# Patient Record
Sex: FEMALE | Race: BLACK OR AFRICAN AMERICAN | NOT HISPANIC OR LATINO | Employment: UNEMPLOYED
[De-identification: names, ages, dates, MRNs, and addresses within clinical notes are randomized per-mention and may not be internally consistent; named-entity substitution may affect disease eponyms.]

---

## 2017-08-19 ENCOUNTER — HEALTH MAINTENANCE LETTER (OUTPATIENT)
Age: 37
End: 2017-08-19

## 2018-09-13 ENCOUNTER — COMMUNICATION - HEALTHEAST (OUTPATIENT)
Dept: SCHEDULING | Facility: CLINIC | Age: 38
End: 2018-09-13

## 2019-12-05 ENCOUNTER — TELEPHONE (OUTPATIENT)
Dept: FAMILY MEDICINE | Facility: CLINIC | Age: 39
End: 2019-12-05

## 2020-07-01 ENCOUNTER — HOSPITAL ENCOUNTER (EMERGENCY)
Facility: CLINIC | Age: 40
Discharge: HOME | End: 2020-07-01
Attending: FAMILY MEDICINE | Admitting: FAMILY MEDICINE
Payer: COMMERCIAL

## 2020-07-01 VITALS
WEIGHT: 214 LBS | HEART RATE: 75 BPM | TEMPERATURE: 97.9 F | RESPIRATION RATE: 14 BRPM | DIASTOLIC BLOOD PRESSURE: 80 MMHG | BODY MASS INDEX: 36.54 KG/M2 | HEIGHT: 64 IN | SYSTOLIC BLOOD PRESSURE: 143 MMHG | OXYGEN SATURATION: 99 %

## 2020-07-01 DIAGNOSIS — R10.2 PREGNANCY RELATED PELVIC PAIN, ANTEPARTUM, SECOND TRIMESTER: ICD-10-CM

## 2020-07-01 DIAGNOSIS — R10.2 PELVIC PAIN IN FEMALE: ICD-10-CM

## 2020-07-01 DIAGNOSIS — O26.892 PREGNANCY RELATED PELVIC PAIN, ANTEPARTUM, SECOND TRIMESTER: ICD-10-CM

## 2020-07-01 DIAGNOSIS — Z3A.22 22 WEEKS GESTATION OF PREGNANCY: ICD-10-CM

## 2020-07-01 LAB
ANION GAP SERPL CALCULATED.3IONS-SCNC: 2 MMOL/L (ref 3–14)
BASOPHILS # BLD AUTO: 0 10E9/L (ref 0–0.2)
BASOPHILS NFR BLD AUTO: 0.1 %
BUN SERPL-MCNC: 4 MG/DL (ref 7–30)
CALCIUM SERPL-MCNC: 8.7 MG/DL (ref 8.5–10.1)
CHLORIDE SERPL-SCNC: 106 MMOL/L (ref 94–109)
CO2 SERPL-SCNC: 30 MMOL/L (ref 20–32)
CREAT SERPL-MCNC: 0.56 MG/DL (ref 0.52–1.04)
DIFFERENTIAL METHOD BLD: ABNORMAL
EOSINOPHIL # BLD AUTO: 0.2 10E9/L (ref 0–0.7)
EOSINOPHIL NFR BLD AUTO: 1.1 %
ERYTHROCYTE [DISTWIDTH] IN BLOOD BY AUTOMATED COUNT: 14.6 % (ref 10–15)
GFR SERPL CREATININE-BSD FRML MDRD: >90 ML/MIN/{1.73_M2}
GLUCOSE SERPL-MCNC: 117 MG/DL (ref 70–99)
HCT VFR BLD AUTO: 32.9 % (ref 35–47)
HGB BLD-MCNC: 10.8 G/DL (ref 11.7–15.7)
IMM GRANULOCYTES # BLD: 0 10E9/L (ref 0–0.4)
IMM GRANULOCYTES NFR BLD: 0.2 %
LYMPHOCYTES # BLD AUTO: 2.3 10E9/L (ref 0.8–5.3)
LYMPHOCYTES NFR BLD AUTO: 17.6 %
MCH RBC QN AUTO: 27.8 PG (ref 26.5–33)
MCHC RBC AUTO-ENTMCNC: 32.8 G/DL (ref 31.5–36.5)
MCV RBC AUTO: 85 FL (ref 78–100)
MONOCYTES # BLD AUTO: 0.4 10E9/L (ref 0–1.3)
MONOCYTES NFR BLD AUTO: 3 %
NEUTROPHILS # BLD AUTO: 10.4 10E9/L (ref 1.6–8.3)
NEUTROPHILS NFR BLD AUTO: 78 %
NRBC # BLD AUTO: 0 10*3/UL
NRBC BLD AUTO-RTO: 0 /100
PLATELET # BLD AUTO: 270 10E9/L (ref 150–450)
POTASSIUM SERPL-SCNC: 3.7 MMOL/L (ref 3.4–5.3)
RBC # BLD AUTO: 3.89 10E12/L (ref 3.8–5.2)
SODIUM SERPL-SCNC: 138 MMOL/L (ref 133–144)
WBC # BLD AUTO: 13.3 10E9/L (ref 4–11)

## 2020-07-01 PROCEDURE — 85025 COMPLETE CBC W/AUTO DIFF WBC: CPT | Performed by: FAMILY MEDICINE

## 2020-07-01 PROCEDURE — 99284 EMERGENCY DEPT VISIT MOD MDM: CPT | Mod: Z6 | Performed by: FAMILY MEDICINE

## 2020-07-01 PROCEDURE — 86850 RBC ANTIBODY SCREEN: CPT | Performed by: OBSTETRICS & GYNECOLOGY

## 2020-07-01 PROCEDURE — 86900 BLOOD TYPING SEROLOGIC ABO: CPT | Performed by: OBSTETRICS & GYNECOLOGY

## 2020-07-01 PROCEDURE — 86901 BLOOD TYPING SEROLOGIC RH(D): CPT | Performed by: OBSTETRICS & GYNECOLOGY

## 2020-07-01 PROCEDURE — 80048 BASIC METABOLIC PNL TOTAL CA: CPT | Performed by: FAMILY MEDICINE

## 2020-07-01 PROCEDURE — 99283 EMERGENCY DEPT VISIT LOW MDM: CPT

## 2020-07-01 ASSESSMENT — MIFFLIN-ST. JEOR: SCORE: 1630.7

## 2020-07-01 NOTE — CONSULTS
"Gynecology Consult Note    Referring Physician: Prem Mock MD  Reason for Consult: Request for termination     HPI: Marci Siegel is a 40 year old , at 22 weeks who presented to the ED with the desire for completion of termination of pregnancy. She underwent Dilapan placement and digoxin administration yesterday at [facility] in anticipation of completion today.  Despite prior counseling that patient would need a medical cab ride home she presented to PP today without a medical cab or other plan for a ride home. The patient left PP prior to arrangement of medical cab and instead presented to the ED requesting termination of pregnancy. Today she reports that she feels anxious, which is normal for her. She denies fever, chills, contractions, cramping, loss of fluid, vaginal discharge or bleeding.  Patient last ate immediately prior to presenting to ED.     OBHx:     PMH: HTN, Anxiety, Asthma    PSH: No past surgical history on file.    Social Hx:   Social History     Tobacco Use     Smoking status: Current Every Day Smoker     Smokeless tobacco: Never Used     Tobacco comment: Offered CIQ but not interested now   Substance Use Topics     Alcohol use: Not Currently     Drug use: Not Currently      ROS:   Negative except per HPI    Objective:   Vitals:    20 1233 20 1243   BP:  (!) 143/80   Pulse: 75    Resp: 14    Temp: 97.9  F (36.6  C)    TempSrc: Oral    SpO2: 99%    Weight:  97.1 kg (214 lb)   Height:  1.626 m (5' 4\")     Constitutional: anxious appearing female  HEENT: Normal appearance.  Neck supple, thyroid normal in size without nodularity or masses.  Cardiovascular: Regular rate, well perfused  Respiratory: no increased work of breathing  : Deferred    Labs/Imaging:  Results for orders placed or performed during the hospital encounter of 20   CBC with platelets differential     Status: Abnormal   Result Value Ref Range    WBC 13.3 (H) 4.0 - 11.0 10e9/L    RBC " Count 3.89 3.8 - 5.2 10e12/L    Hemoglobin 10.8 (L) 11.7 - 15.7 g/dL    Hematocrit 32.9 (L) 35.0 - 47.0 %    MCV 85 78 - 100 fl    MCH 27.8 26.5 - 33.0 pg    MCHC 32.8 31.5 - 36.5 g/dL    RDW 14.6 10.0 - 15.0 %    Platelet Count 270 150 - 450 10e9/L    Diff Method Automated Method     % Neutrophils 78.0 %    % Lymphocytes 17.6 %    % Monocytes 3.0 %    % Eosinophils 1.1 %    % Basophils 0.1 %    % Immature Granulocytes 0.2 %    Nucleated RBCs 0 0 /100    Absolute Neutrophil 10.4 (H) 1.6 - 8.3 10e9/L    Absolute Lymphocytes 2.3 0.8 - 5.3 10e9/L    Absolute Monocytes 0.4 0.0 - 1.3 10e9/L    Absolute Eosinophils 0.2 0.0 - 0.7 10e9/L    Absolute Basophils 0.0 0.0 - 0.2 10e9/L    Abs Immature Granulocytes 0.0 0 - 0.4 10e9/L    Absolute Nucleated RBC 0.0    Basic metabolic panel     Status: Abnormal   Result Value Ref Range    Sodium 138 133 - 144 mmol/L    Potassium 3.7 3.4 - 5.3 mmol/L    Chloride 106 94 - 109 mmol/L    Carbon Dioxide 30 20 - 32 mmol/L    Anion Gap 2 (L) 3 - 14 mmol/L    Glucose 117 (H) 70 - 99 mg/dL    Urea Nitrogen 4 (L) 7 - 30 mg/dL    Creatinine 0.56 0.52 - 1.04 mg/dL    GFR Estimate >90 >60 mL/min/[1.73_m2]    GFR Estimate If Black >90 >60 mL/min/[1.73_m2]    Calcium 8.7 8.5 - 10.1 mg/dL        Assessment/Plan:   Marci Siegel is a 40 year old  presenting to the emergency department for completion of termination of pregnancy. She had 8 Dilapan placed yesterday, received mifepristone, and digoxin injection. We reviewed options including completion of procedure in the OR at  this evening or at [facility] tomorrow if she arranges medical cab. Patient stated that she must get home today and desires her procedure tomorrow at [facility]. Social Work consult was provided to assist patient with setting up a Medical Cab. Patient instructed to present to care if she experiences contractions, cramping, LOF, vaginal bleeding, fever or chills.     Patient demonstrated understanding.      Discussed with Dr. Boraas Alicia Myhre, DO  Obstetrics and Gynecology, PGY-4  July 1, 2020, 2:20 PM     I discussed Marci Robbin on 7/1/2020 with Dr. Myhre and agree with the presentation, exam and plan of care documented in this note with edits by me.   Kalyani Pelletier MD MPH

## 2020-07-01 NOTE — ED TRIAGE NOTES
Patient claims she was going to [facility] but she didn't have a ride home that is why she was sent over here to have the D & C done.

## 2020-07-01 NOTE — ED PROVIDER NOTES
[facility] EMERGENCY DEPARTMENT ([facility])     2020  History     Chief Complaint   Patient presents with     Post-op Problem     Patiet sent over from [facility] [employer] to have D & C . Patient is 22 1/ 2 weeks pregnant     The history is provided by the patient and medical records.     Marci Siegel is a 40 year old female para 8, at approximately 23 weeks gestation with a medical history significant for umbilical hernia, heart murmur, HTN, odontalgia, sepsis, CAP, and right foot bursitis who presents to the ED today complaining of a postop problem.  Patient reports she was dilated yesterday for an  procedure that is supposed to occur today.  Patient reports she is 23 weeks pregnant.  Patient reports she has had 8 previous pregnancies and this is her ninth pregnancy.  Apparently was supposed to complete the second portion of her procedure at [facility] today but did not have the appropriate transportation and so she states she was referred to the ED.  She is feeling a lot of pelvic pressure.  No bleeding.  No other complaints.    I have reviewed the Medications, Allergies, Past Medical and Surgical History, and Social History in the [employer] system.  PAST MEDICAL HISTORY: History reviewed. No pertinent past medical history.    PAST SURGICAL HISTORY: No past surgical history on file.    Past medical history, past surgical history, medications, and allergies were reviewed with the patient. Additional pertinent items: None    FAMILY HISTORY: No family history on file.    SOCIAL HISTORY:   Social History     Tobacco Use     Smoking status: Current Every Day Smoker     Smokeless tobacco: Never Used     Tobacco comment: Offered CIQ but not interested now   Substance Use Topics     Alcohol use: Not Currently     Social history was reviewed with the patient. Additional pertinent items: None      Patient's Medications   New Prescriptions    No medications on file   Previous Medications     "TRIMETHOPRIM-POLYMYXIN B (POLYTRIM) OPHTHALMIC SOLUTION    Place 1 drop into both eyes every 6 hours for 7 days.   Modified Medications    No medications on file   Discontinued Medications    No medications on file          Allergies   Allergen Reactions     Nkda [No Known Drug Allergies]         Review of Systems   All other systems reviewed and are negative.    A complete review of systems was performed with pertinent positives and negatives noted in the HPI, and all other systems negative.    Physical Exam   BP: (!) 143/80  Pulse: 75  Temp: 97.9  F (36.6  C)  Resp: 14  Height: 162.6 cm (5' 4\")  Weight: 97.1 kg (214 lb)  SpO2: 99 %      Physical Exam  Constitutional:       General: She is not in acute distress.     Appearance: She is not diaphoretic.   HENT:      Head: Atraumatic.      Mouth/Throat:      Pharynx: No oropharyngeal exudate.   Eyes:      General: No scleral icterus.     Pupils: Pupils are equal, round, and reactive to light.   Cardiovascular:      Heart sounds: Normal heart sounds.   Pulmonary:      Effort: No respiratory distress.      Breath sounds: Normal breath sounds.   Abdominal:      General: Bowel sounds are normal.      Palpations: Abdomen is soft.      Tenderness: There is no abdominal tenderness.      Comments: Gravid abdomen, minimally tender   Musculoskeletal:         General: No tenderness.   Skin:     General: Skin is warm.      Findings: No rash.         ED Course   12:48 PM  The patient was seen and examined by Prem Mock MD in Room ED02.        Procedures                           Results for orders placed or performed during the hospital encounter of 07/01/20 (from the past 24 hour(s))   CBC with platelets differential   Result Value Ref Range    WBC 13.3 (H) 4.0 - 11.0 10e9/L    RBC Count 3.89 3.8 - 5.2 10e12/L    Hemoglobin 10.8 (L) 11.7 - 15.7 g/dL    Hematocrit 32.9 (L) 35.0 - 47.0 %    MCV 85 78 - 100 fl    MCH 27.8 26.5 - 33.0 pg    MCHC 32.8 31.5 - 36.5 g/dL    RDW 14.6 " 10.0 - 15.0 %    Platelet Count 270 150 - 450 10e9/L    Diff Method Automated Method     % Neutrophils 78.0 %    % Lymphocytes 17.6 %    % Monocytes 3.0 %    % Eosinophils 1.1 %    % Basophils 0.1 %    % Immature Granulocytes 0.2 %    Nucleated RBCs 0 0 /100    Absolute Neutrophil 10.4 (H) 1.6 - 8.3 10e9/L    Absolute Lymphocytes 2.3 0.8 - 5.3 10e9/L    Absolute Monocytes 0.4 0.0 - 1.3 10e9/L    Absolute Eosinophils 0.2 0.0 - 0.7 10e9/L    Absolute Basophils 0.0 0.0 - 0.2 10e9/L    Abs Immature Granulocytes 0.0 0 - 0.4 10e9/L    Absolute Nucleated RBC 0.0    Basic metabolic panel   Result Value Ref Range    Sodium 138 133 - 144 mmol/L    Potassium 3.7 3.4 - 5.3 mmol/L    Chloride 106 94 - 109 mmol/L    Carbon Dioxide 30 20 - 32 mmol/L    Anion Gap 2 (L) 3 - 14 mmol/L    Glucose 117 (H) 70 - 99 mg/dL    Urea Nitrogen 4 (L) 7 - 30 mg/dL    Creatinine 0.56 0.52 - 1.04 mg/dL    GFR Estimate >90 >60 mL/min/[1.73_m2]    GFR Estimate If Black >90 >60 mL/min/[1.73_m2]    Calcium 8.7 8.5 - 10.1 mg/dL     Medications - No data to display          Assessments & Plan (with Medical Decision Making)    9 para 8 who is at approximately 22 and half to 23 weeks gestation presenting due to pelvic pain.  She had the initiation of an  procedure through [facility] yesterday but today due to logistical factors was unable to complete that.  Was referred here.  Saw OB/GYN.  She was offered several options but chose to be discharged so that she could complete the procedure at [facility] tomorrow.   spoke with her to help arrange the appropriate transport tomorrow which appeared to be the problem causing her to be unable to have the procedure completed today.  Patient states she understands the indications for return and follow-up and will be discharged.    I have reviewed the nursing notes.    I have reviewed the findings, diagnosis, plan and need for follow up with the patient.    New  Prescriptions    No medications on file       Final diagnoses:   Pelvic pain in female   IHussein, am serving as a trained medical scribe to document services personally performed by Prem Mock MD, based on the provider's statements to me.     Prem COTE MD, was physically present and have reviewed and verified the accuracy of this note documented by Hussein Merrill.      7/1/2020   [facility], [place], EMERGENCY DEPARTMENT     Prem Mock MD  07/01/20 7727

## 2020-07-01 NOTE — PROGRESS NOTES
Social Work Services Progress Note    Hospital Day: 1  Date of Initial Social Work Evaluation:  Not completed  Collaborated with:  ED RN, pt    Data:  SW consulted to assist with ride resources. Per ED RN, pt was scheduled to have part two of an  procedure at [facility] today. Pt arrived to her appointment but didn't have reliable transportation home and was sent to the ED to get the procedure complete. Pt unable to get part two of her procedure here. ED RN requesting assistance helping pt get ride resources for her appointment.    SW called [employer] in pt's room to discuss. Pt wanting the [employer] transportation number. Pt has medical ride benefits through her insurance. Number given to pt [phone]-pt comfortable calling to set up. Pt stated that her procedure was rescheduled at [facility] for tomorrow. Pt unsure what time, pt asking writer. Writer stated that they did not have access to their system, writer encouraged pt to call to confirm her time, [facility] contact given:    [facility] - [address] [address]  [phone]    Pt will call to confirm her appointment time and then call [employer] to set up transportation for after her appointment tomorrow.    No other questions by pt at this time.    Intervention: Resource referral    Assessment:  Pt seeking assistance for transportation after procedure tomorrow, resources provided    Plan:    Anticipated Disposition:  Home, no needs identified    Barriers to d/c plan:  None    Follow Up:  SW to remain available    [facility]  Unit 5/[address] 8 Ortho/Med/Surg & [facility] Adult ED  Phone: [phone] Pager: [phone]

## 2020-07-01 NOTE — DISCHARGE INSTRUCTIONS
Thank you for choosing [facility].     Please closely monitor for further symptoms. Return to the Emergency Department if you develop any new or worsening signs or symptoms.    If you received any opiate pain medications or sedatives during your visit, please do not drive for at least 8 hours.     Labs, cultures or final xray interpretations may still need to be reviewed.  We will call you if your plan of care needs to be changed.    Please follow up with [facility] tomorrow at 9 AM as discussed with the obstetrician today.  Use resources provided by social work to assure you have the appropriate transportation after your procedure tomorrow.  '

## 2020-07-01 NOTE — ED NOTES
"Patient reports, \" I feel like I need to walk, I am feeling a lot of pressure\" Writer asked if she needed to push, and she said she wasn't sure.   "

## 2020-07-01 NOTE — ED NOTES
Writer spoke with social work, Cathy about resources for patient. Cathy reported that she was going to call the patient or stop down and see her. Patient updated on plan of care

## 2020-07-01 NOTE — ED AVS SNAPSHOT
[facility], [place], Emergency Department  [address]  [facility] [address]  Phone:  [phone]  Fax:  [phone]                                    Marci Siegel   MRN: [id number]    Department:  [facility], Emergency Department   Date of Visit:  7/1/2020           After Visit Summary Signature Page    I have received my discharge instructions, and my questions have been answered. I have discussed any challenges I see with this plan with the nurse or doctor.    ..........................................................................................................................................  Patient/Patient Representative Signature      ..........................................................................................................................................  Patient Representative Print Name and Relationship to Patient    ..................................................               ................................................  Date                                   Time    ..........................................................................................................................................  Reviewed by Signature/Title    ...................................................              ..............................................  Date                                               Time          22EPIC Rev 08/18

## 2020-07-02 ENCOUNTER — ANESTHESIA (OUTPATIENT)
Dept: SURGERY | Facility: CLINIC | Age: 40
End: 2020-07-02
Payer: MEDICAID

## 2020-07-02 ENCOUNTER — ANESTHESIA EVENT (OUTPATIENT)
Dept: SURGERY | Facility: CLINIC | Age: 40
End: 2020-07-02
Payer: MEDICAID

## 2020-07-02 ENCOUNTER — HOSPITAL ENCOUNTER (OUTPATIENT)
Facility: CLINIC | Age: 40
Discharge: HOME | End: 2020-07-02
Attending: EMERGENCY MEDICINE | Admitting: OBSTETRICS & GYNECOLOGY
Payer: MEDICAID

## 2020-07-02 VITALS
TEMPERATURE: 98.2 F | DIASTOLIC BLOOD PRESSURE: 112 MMHG | BODY MASS INDEX: 36.73 KG/M2 | RESPIRATION RATE: 18 BRPM | OXYGEN SATURATION: 100 % | WEIGHT: 214 LBS | HEART RATE: 78 BPM | SYSTOLIC BLOOD PRESSURE: 162 MMHG

## 2020-07-02 DIAGNOSIS — O03.4 INCOMPLETE ABORTION: ICD-10-CM

## 2020-07-02 LAB
ABO + RH BLD: NORMAL
ABO + RH BLD: NORMAL
BLD GP AB SCN SERPL QL: NORMAL
BLOOD BANK CMNT PATIENT-IMP: NORMAL
SPECIMEN EXP DATE BLD: NORMAL

## 2020-07-02 PROCEDURE — 37000009 ZZH ANESTHESIA TECHNICAL FEE, EACH ADDTL 15 MIN: Performed by: OBSTETRICS & GYNECOLOGY

## 2020-07-02 PROCEDURE — 27210794 ZZH OR GENERAL SUPPLY STERILE: Performed by: OBSTETRICS & GYNECOLOGY

## 2020-07-02 PROCEDURE — 25000128 H RX IP 250 OP 636: Performed by: OBSTETRICS & GYNECOLOGY

## 2020-07-02 PROCEDURE — 25000125 ZZHC RX 250: Performed by: OBSTETRICS & GYNECOLOGY

## 2020-07-02 PROCEDURE — 40000170 ZZH STATISTIC PRE-PROCEDURE ASSESSMENT II: Performed by: OBSTETRICS & GYNECOLOGY

## 2020-07-02 PROCEDURE — 36000053 ZZH SURGERY LEVEL 2 EA 15 ADDTL MIN - UMMC: Performed by: OBSTETRICS & GYNECOLOGY

## 2020-07-02 PROCEDURE — 99285 EMERGENCY DEPT VISIT HI MDM: CPT | Mod: 25

## 2020-07-02 PROCEDURE — 71000027 ZZH RECOVERY PHASE 2 EACH 15 MINS: Performed by: OBSTETRICS & GYNECOLOGY

## 2020-07-02 PROCEDURE — 96374 THER/PROPH/DIAG INJ IV PUSH: CPT

## 2020-07-02 PROCEDURE — 25000132 ZZH RX MED GY IP 250 OP 250 PS 637: Performed by: ANESTHESIOLOGY

## 2020-07-02 PROCEDURE — 25000128 H RX IP 250 OP 636: Performed by: ANESTHESIOLOGY

## 2020-07-02 PROCEDURE — 99285 EMERGENCY DEPT VISIT HI MDM: CPT | Mod: Z6 | Performed by: EMERGENCY MEDICINE

## 2020-07-02 PROCEDURE — 25800030 ZZH RX IP 258 OP 636: Performed by: NURSE ANESTHETIST, CERTIFIED REGISTERED

## 2020-07-02 PROCEDURE — 25000128 H RX IP 250 OP 636: Performed by: NURSE ANESTHETIST, CERTIFIED REGISTERED

## 2020-07-02 PROCEDURE — 36000051 ZZH SURGERY LEVEL 2 1ST 30 MIN - UMMC: Performed by: OBSTETRICS & GYNECOLOGY

## 2020-07-02 PROCEDURE — 25800030 ZZH RX IP 258 OP 636: Performed by: OBSTETRICS & GYNECOLOGY

## 2020-07-02 PROCEDURE — 37000008 ZZH ANESTHESIA TECHNICAL FEE, 1ST 30 MIN: Performed by: OBSTETRICS & GYNECOLOGY

## 2020-07-02 PROCEDURE — 25000125 ZZHC RX 250: Performed by: NURSE ANESTHETIST, CERTIFIED REGISTERED

## 2020-07-02 PROCEDURE — 88305 TISSUE EXAM BY PATHOLOGIST: CPT | Mod: 26 | Performed by: OBSTETRICS & GYNECOLOGY

## 2020-07-02 PROCEDURE — 88305 TISSUE EXAM BY PATHOLOGIST: CPT | Performed by: OBSTETRICS & GYNECOLOGY

## 2020-07-02 PROCEDURE — 00000159 ZZHCL STATISTIC H-SEND OUTS PREP: Performed by: OBSTETRICS & GYNECOLOGY

## 2020-07-02 PROCEDURE — 25000132 ZZH RX MED GY IP 250 OP 250 PS 637: Performed by: OBSTETRICS & GYNECOLOGY

## 2020-07-02 RX ORDER — ONDANSETRON 2 MG/ML
INJECTION INTRAMUSCULAR; INTRAVENOUS PRN
Status: DISCONTINUED | OUTPATIENT
Start: 2020-07-02 | End: 2020-07-02

## 2020-07-02 RX ORDER — OXYTOCIN/0.9 % SODIUM CHLORIDE 30/500 ML
PLASTIC BAG, INJECTION (ML) INTRAVENOUS CONTINUOUS PRN
Status: DISCONTINUED | OUTPATIENT
Start: 2020-07-02 | End: 2020-07-02

## 2020-07-02 RX ORDER — ACETAMINOPHEN 325 MG/1
975 TABLET ORAL ONCE
Status: COMPLETED | OUTPATIENT
Start: 2020-07-02 | End: 2020-07-02

## 2020-07-02 RX ORDER — SODIUM CHLORIDE, SODIUM LACTATE, POTASSIUM CHLORIDE, CALCIUM CHLORIDE 600; 310; 30; 20 MG/100ML; MG/100ML; MG/100ML; MG/100ML
INJECTION, SOLUTION INTRAVENOUS CONTINUOUS PRN
Status: DISCONTINUED | OUTPATIENT
Start: 2020-07-02 | End: 2020-07-02

## 2020-07-02 RX ORDER — GABAPENTIN 100 MG/1
300 CAPSULE ORAL ONCE
Status: COMPLETED | OUTPATIENT
Start: 2020-07-02 | End: 2020-07-02

## 2020-07-02 RX ORDER — OXYCODONE HYDROCHLORIDE 5 MG/1
5 TABLET ORAL
Status: COMPLETED | OUTPATIENT
Start: 2020-07-02 | End: 2020-07-02

## 2020-07-02 RX ORDER — FENTANYL CITRATE 50 UG/ML
25 INJECTION, SOLUTION INTRAMUSCULAR; INTRAVENOUS ONCE
Status: COMPLETED | OUTPATIENT
Start: 2020-07-02 | End: 2020-07-02

## 2020-07-02 RX ORDER — FENTANYL CITRATE 50 UG/ML
25-50 INJECTION, SOLUTION INTRAMUSCULAR; INTRAVENOUS EVERY 5 MIN PRN
Status: DISCONTINUED | OUTPATIENT
Start: 2020-07-02 | End: 2020-07-02 | Stop reason: HOSPADM

## 2020-07-02 RX ORDER — OXYCODONE HYDROCHLORIDE 5 MG/1
5 TABLET ORAL EVERY 4 HOURS PRN
Status: DISCONTINUED | OUTPATIENT
Start: 2020-07-02 | End: 2020-07-02 | Stop reason: HOSPADM

## 2020-07-02 RX ORDER — IBUPROFEN 600 MG/1
600 TABLET, FILM COATED ORAL
Status: COMPLETED | OUTPATIENT
Start: 2020-07-02 | End: 2020-07-02

## 2020-07-02 RX ORDER — MISOPROSTOL 200 UG/1
TABLET ORAL PRN
Status: DISCONTINUED | OUTPATIENT
Start: 2020-07-02 | End: 2020-07-02 | Stop reason: HOSPADM

## 2020-07-02 RX ORDER — FENTANYL CITRATE 50 UG/ML
INJECTION, SOLUTION INTRAMUSCULAR; INTRAVENOUS PRN
Status: DISCONTINUED | OUTPATIENT
Start: 2020-07-02 | End: 2020-07-02

## 2020-07-02 RX ORDER — SODIUM CHLORIDE, SODIUM LACTATE, POTASSIUM CHLORIDE, CALCIUM CHLORIDE 600; 310; 30; 20 MG/100ML; MG/100ML; MG/100ML; MG/100ML
INJECTION, SOLUTION INTRAVENOUS CONTINUOUS
Status: DISCONTINUED | OUTPATIENT
Start: 2020-07-02 | End: 2020-07-02 | Stop reason: HOSPADM

## 2020-07-02 RX ORDER — PROPOFOL 10 MG/ML
INJECTION, EMULSION INTRAVENOUS CONTINUOUS PRN
Status: DISCONTINUED | OUTPATIENT
Start: 2020-07-02 | End: 2020-07-02

## 2020-07-02 RX ORDER — KETOROLAC TROMETHAMINE 30 MG/ML
INJECTION, SOLUTION INTRAMUSCULAR; INTRAVENOUS PRN
Status: DISCONTINUED | OUTPATIENT
Start: 2020-07-02 | End: 2020-07-02

## 2020-07-02 RX ORDER — NALOXONE HYDROCHLORIDE 0.4 MG/ML
.1-.4 INJECTION, SOLUTION INTRAMUSCULAR; INTRAVENOUS; SUBCUTANEOUS
Status: DISCONTINUED | OUTPATIENT
Start: 2020-07-02 | End: 2020-07-02 | Stop reason: HOSPADM

## 2020-07-02 RX ORDER — ONDANSETRON 4 MG/1
4 TABLET, ORALLY DISINTEGRATING ORAL
Status: DISCONTINUED | OUTPATIENT
Start: 2020-07-02 | End: 2020-07-02 | Stop reason: HOSPADM

## 2020-07-02 RX ORDER — MEPERIDINE HYDROCHLORIDE 25 MG/ML
12.5 INJECTION INTRAMUSCULAR; INTRAVENOUS; SUBCUTANEOUS
Status: DISCONTINUED | OUTPATIENT
Start: 2020-07-02 | End: 2020-07-02 | Stop reason: HOSPADM

## 2020-07-02 RX ORDER — ONDANSETRON 4 MG/1
4 TABLET, ORALLY DISINTEGRATING ORAL EVERY 30 MIN PRN
Status: DISCONTINUED | OUTPATIENT
Start: 2020-07-02 | End: 2020-07-02 | Stop reason: HOSPADM

## 2020-07-02 RX ORDER — ONDANSETRON 2 MG/ML
4 INJECTION INTRAMUSCULAR; INTRAVENOUS EVERY 30 MIN PRN
Status: DISCONTINUED | OUTPATIENT
Start: 2020-07-02 | End: 2020-07-02 | Stop reason: HOSPADM

## 2020-07-02 RX ADMIN — ACETAMINOPHEN 975 MG: 325 TABLET, FILM COATED ORAL at 06:06

## 2020-07-02 RX ADMIN — DOXYCYCLINE 200 MG: 100 INJECTION, POWDER, LYOPHILIZED, FOR SOLUTION INTRAVENOUS at 06:50

## 2020-07-02 RX ADMIN — OXYCODONE HYDROCHLORIDE 5 MG: 5 TABLET ORAL at 08:25

## 2020-07-02 RX ADMIN — GABAPENTIN 300 MG: 300 CAPSULE ORAL at 06:06

## 2020-07-02 RX ADMIN — KETOROLAC TROMETHAMINE 30 MG: 30 INJECTION, SOLUTION INTRAMUSCULAR at 07:29

## 2020-07-02 RX ADMIN — FENTANYL CITRATE 25 MCG: 50 INJECTION INTRAMUSCULAR; INTRAVENOUS at 06:14

## 2020-07-02 RX ADMIN — OXYTOCIN-SODIUM CHLORIDE 0.9% IV SOLN 30 UNIT/500ML 300 ML/HR: 30-0.9/5 SOLUTION at 07:05

## 2020-07-02 RX ADMIN — FENTANYL CITRATE 25 MCG: 50 INJECTION, SOLUTION INTRAMUSCULAR; INTRAVENOUS at 06:46

## 2020-07-02 RX ADMIN — SODIUM CHLORIDE, POTASSIUM CHLORIDE, SODIUM LACTATE AND CALCIUM CHLORIDE: 600; 310; 30; 20 INJECTION, SOLUTION INTRAVENOUS at 06:37

## 2020-07-02 RX ADMIN — FENTANYL CITRATE 25 MCG: 50 INJECTION, SOLUTION INTRAMUSCULAR; INTRAVENOUS at 07:23

## 2020-07-02 RX ADMIN — MIDAZOLAM 2 MG: 1 INJECTION INTRAMUSCULAR; INTRAVENOUS at 06:30

## 2020-07-02 RX ADMIN — IBUPROFEN 600 MG: 600 TABLET ORAL at 08:25

## 2020-07-02 RX ADMIN — PROPOFOL 150 MCG/KG/MIN: 10 INJECTION, EMULSION INTRAVENOUS at 06:46

## 2020-07-02 RX ADMIN — ONDANSETRON 4 MG: 2 INJECTION INTRAMUSCULAR; INTRAVENOUS at 07:24

## 2020-07-02 ASSESSMENT — PAIN DESCRIPTION - DESCRIPTORS: DESCRIPTORS: OTHER (COMMENT)

## 2020-07-02 ASSESSMENT — LIFESTYLE VARIABLES: TOBACCO_USE: 1

## 2020-07-02 NOTE — ANESTHESIA CARE TRANSFER NOTE
Patient: Marci Siegel    Procedure(s):  DILATION AND EVACUATION, UTERUS    Diagnosis: * No pre-op diagnosis entered *  Diagnosis Additional Information: No value filed.    Anesthesia Type:   MAC     Note:  Airway :Nasal Cannula  Patient transferred to:Phase II  Handoff Report: Identifed the Patient, Identified the Reponsible Provider, Reviewed the pertinent medical history, Discussed the surgical course, Reviewed Intra-OP anesthesia mangement and issues during anesthesia, Set expectations for post-procedure period and Allowed opportunity for questions and acknowledgement of understanding      Vitals: (Last set prior to Anesthesia Care Transfer)    CRNA VITALS  7/2/2020 0701 - 7/2/2020 0739      7/2/2020             Resp Rate (observed):  12                Electronically Signed By: JUAN Woodard CRNA  July 2, 2020  7:39 AM

## 2020-07-02 NOTE — ED PROVIDER NOTES
ED Provider Note  [facility]      History     Chief Complaint   Patient presents with     Contractions     Pt had partial D & C at [facility] and was supposed to go back yesterday.  Pt having painful contractions     HPI  Marci Siegel is a 40 year old female who presents stating she feels like she is having contractions.  She was seen less than 24 hours ago in the emergency department.  From that (consult) note:    Marci Siegel is a 40 year old , at 22 weeks who presented to the ED with the desire for completion of termination of pregnancy. She underwent Dilapan placement and digoxin administration yesterday at [facility] in anticipation of completion today.  Despite prior counseling that patient would need a medical cab ride home she presented to PP today without a medical cab or other plan for a ride home. The patient left PP prior to arrangement of medical cab and instead presented to the ED requesting termination of pregnancy. Today she reports that she feels anxious, which is normal for her. She denies fever, chills, contractions, cramping, loss of fluid, vaginal discharge or bleeding.  Patient last ate immediately prior to presenting to ED    Assessment/Plan:   Marci Siegel is a 40 year old  presenting to the emergency department for completion of termination of pregnancy. She had 8 Dilapan placed yesterday, received mifepristone, and digoxin injection. We reviewed options including completion of procedure in the OR at [facility] this evening or at [facility] tomorrow if she arranges medical cab. Patient stated that she must get home today and desires her procedure tomorrow at [facility]. Social Work consult was provided to assist patient with setting up a Medical Cab. Patient instructed to present to care if she experiences contractions, cramping, LOF, vaginal bleeding, fever or chills.      Patient demonstrated understanding.         The  patient states that within the last hour she got up to go to the bathroom, noted she is having some bleeding, though not extreme.  She states she started having cramping and what feels like heavy contractions.  She states she has the urge to push.    The patient states that she feels like she is having strong cramping/contractions.  She states she feels like maybe she should try to push.    Past Medical History  No past medical history on file.  No past surgical history on file.  No current outpatient medications on file.    Allergies   Allergen Reactions     Nkda [No Known Drug Allergies]      Past medical history, past surgical history, medications, and allergies were reviewed with the patient. Additional pertinent items: None    Family History  No family history on file.  Family history was reviewed with the patient. Additional pertinent items: None    Social History  Social History     Tobacco Use     Smoking status: Current Every Day Smoker     Smokeless tobacco: Never Used     Tobacco comment: Offered CIQ but not interested now   Substance Use Topics     Alcohol use: Not Currently     Drug use: Not Currently      Social history was reviewed with the patient. Additional pertinent items: None    Review of Systems  A complete review of systems was performed with pertinent positives and negatives noted in the HPI, and all other systems negative.    Physical Exam   BP: (!) 141/96  Pulse: 83  Temp: 98.5  F (36.9  C)  Resp: 20  Weight: 97.1 kg (214 lb)  SpO2: 99 %  Physical Exam  Constitutional:       General: She is in acute distress (Appears uncomfortable, anxious).      Appearance: She is not diaphoretic.   HENT:      Head: Atraumatic.      Mouth/Throat:      Pharynx: No oropharyngeal exudate.   Eyes:      General: No scleral icterus.  Cardiovascular:      Heart sounds: Normal heart sounds.   Pulmonary:      Effort: No respiratory distress.      Breath sounds: Normal breath sounds.   Abdominal:      Palpations:  Abdomen is soft.      Tenderness: There is abdominal tenderness (Suprapubic tenderness).   Musculoskeletal:         General: No tenderness.   Skin:     General: Skin is warm.      Findings: No rash.         ED Course      Procedures                         No results found for any visits on 20.  Medications   acetaminophen (TYLENOL) tablet 975 mg (has no administration in time range)   gabapentin (NEURONTIN) capsule 300 mg (has no administration in time range)   doxycycline (VIBRAMYCIN) 200 mg in sodium chloride 0.9 % 100 mL intermittent infusion (has no administration in time range)   fentaNYL (PF) (SUBLIMAZE) injection 25 mcg (has no administration in time range)        Assessments & Plan (with Medical Decision Making)   I did call OB who did come down emergently to evaluate the patient.  They to take her to the OR for definitive procedure.    Dictation Disclaimer: Some of this Note has been completed with voice-recognition dictation software. Although errors are generally corrected real-time, there is the potential for a rare error to be present in the completed chart.      I have reviewed the nursing notes. I have reviewed the findings, diagnosis, plan and need for follow up with the patient.    Current Discharge Medication List          Final diagnoses:   Incomplete        --  Ana Camarena MD   Emergency Medicine   [facility], [place], EMERGENCY DEPARTMENT  2020     Ana Camarena MD  [phone]

## 2020-07-02 NOTE — H&P
"History & Physical     Name:            Marci Siegel  YOB: 1980    ED consult requested    SUBJECTIVE:     Marci Siegel is a 40 year old  at 22w4d today by ultrasound on 20 who represents to ED tonight with cramping pain, every \"couple\" of minutes, feels like she has to push, really uncomfortable.  States pain started really around 2 AM.  Feels anxious, worried.    Patient presented to [facility] on 20, had 8 dilapan-S placed, received mife and digoxin.  When she returned the following day, she didn't have a ride (required for sedation) and stormed out before staff could help arrange a medical cab.    OB History      Past Medical History:   Diagnosis Date     Asthma      HTN (hypertension)      Obesity        No past surgical history on file.    Medications:  Current Facility-Administered Medications   Medication     acetaminophen (TYLENOL) tablet 975 mg     doxycycline (VIBRAMYCIN) 200 mg in sodium chloride 0.9 % 100 mL intermittent infusion     fentaNYL (PF) (SUBLIMAZE) injection 25 mcg     gabapentin (NEURONTIN) capsule 300 mg       Allergies:   Allergies   Allergen Reactions     Nkda [No Known Drug Allergies]        Social History:  Social History     Tobacco Use     Smoking status: Current Every Day Smoker     Smokeless tobacco: Never Used     Tobacco comment: Offered CIQ but not interested now   Substance Use Topics     Alcohol use: Not Currently     Drug use: Not Currently       Family History:  Denies problems with anesthesia, bleeding, hypercoagulation.    ROS:  Significant for as above in HPI.  All other systems reviewed and are negative.    OBJECTIVE:     BP (!) 141/96   Pulse 83   Temp 98.5  F (36.9  C) (Oral)   Resp 20   Wt 97.1 kg (214 lb)   SpO2 99%   BMI 36.73 kg/m   Body mass index is 36.73 kg/m .  General:  Alert, no distress   HEENT:  Normocephalic, without obvious abnormality   Lungs:  Clear to auscultation bilaterally   Heart:  Regular rate and rhythm, no " murmur   Abdomen:  Soft, non-tender, non-distended, bowel sounds normal.    Pelvic: dilapan palpable in cervix   Extremities: normal   Psychiatric  Normal orientation, mood and affect     Blood Type: Rh pos  Bedside ultrasound with absence of FCA, pregnancy well within uterus    ASSESSMENT:     40 year old female  at 22w4d, plan D&E.    PLAN:     -- D&E instructions reviewed and instruction booklet dispensed.   Pathology Exam: routine   Memory box: no   Tissue Disposition: hospital   OR Orders:     - adult type and Rh    - hemacue    - doxycycline    Kalyani Pelletier MD, MPH

## 2020-07-02 NOTE — ANESTHESIA POSTPROCEDURE EVALUATION
Anesthesia POST Procedure Evaluation    Patient: Marci Siegel   MRN:     [id number] Gender:   female   Age:    40 year old :      1980        Preoperative Diagnosis: * No pre-op diagnosis entered *   Procedure(s):  DILATION AND EVACUATION, UTERUS   Postop Comments: No value filed.     Anesthesia Type: MAC       Disposition: Outpatient   Postop Pain Control: Uneventful            Sign Out: Well controlled pain   PONV: No   Neuro/Psych: Uneventful            Sign Out: Acceptable/Baseline neuro status   Airway/Respiratory: Uneventful            Sign Out: Acceptable/Baseline resp. status   CV/Hemodynamics: Uneventful            Sign Out: Acceptable CV status   Other NRE: NONE   DID A NON-ROUTINE EVENT OCCUR? No         Last Anesthesia Record Vitals:  CRNA VITALS  2020 0701 - 2020 0801      2020             Resp Rate (observed):  12          Last PACU Vitals:  Vitals Value Taken Time   /111 2020  7:32 AM   Temp 36.5  C (97.7  F) 2020  7:32 AM   Pulse 77 2020  7:32 AM   Resp 18 2020  7:32 AM   SpO2 98 % 2020  7:32 AM   Temp src     NIBP 169/97 2020  7:25 AM   Pulse 99 2020  7:26 AM   SpO2 100 % 2020  7:26 AM   Resp     Temp 35.7  C (96.3  F) 2020  7:25 AM   Ht Rate 96 2020  7:25 AM   Temp 2           Electronically Signed By: Kannan Chanel MD, 2020, 10:30 AM

## 2020-07-02 NOTE — DISCHARGE INSTRUCTIONS
Same-Day Surgery   Adult Discharge Orders & Instructions     For 24 hours after surgery:  1. Get plenty of rest.  A responsible adult must stay with you for at least 24 hours after you leave the hospital.   2. Pain medication can slow your reflexes. Do not drive or use heavy equipment.  If you have weakness or tingling, don't drive or use heavy equipment until this feeling goes away.  3. Mixing alcohol and pain medication can cause dizziness and slow your breathing. It can even be fatal. Do not drink alcohol while taking pain medication.  4. Avoid strenuous or risky activities.  Ask for help when climbing stairs.   5. You may feel lightheaded.  If so, sit for a few minutes before standing.  Have someone help you get up.   6. If you have nausea (feel sick to your stomach), drink only clear liquids such as apple juice, ginger ale, broth or 7-Up.  Rest may also help.  Be sure to drink enough fluids.  Move to a regular diet as you feel able. Take pain medications with a small amount of solid food, such as toast or crackers, to avoid nausea.   7. A slight fever is normal. Call the doctor if your fever is over 100 F (37.7 C) (taken under the tongue) or lasts longer than 24 hours.  8. You may have a dry mouth, muscle aches, trouble sleeping or a sore throat.  These symptoms should go away after 24 hours.  9. Do not make important or legal decisions.   Pain Management:      1. Take pain medication (if prescribed) for pain as directed by your physician.        2. WARNING: If the pain medication you have been prescribed contains Tylenol  (acetaminophen), DO NOT take additional doses of Tylenol (acetaminophen).     Call your doctor for any of the followin.  Signs of infection (fever, growing tenderness at the surgery site, severe pain, a large amount of drainage or bleeding, foul-smelling drainage, redness, swelling).    2.  It has been over 8 to 10 hours since surgery and you are still not able to urinate (pee).    3.   Headache for over 24 hours.    4.  Numbness, tingling or weakness the day after surgery (if you had spinal anesthesia).  To contact a doctor, call :      [phone] and ask for the Resident On Call for:          OB/Gyn, Dr. Pelletier (answered 24 hours a day)      Emergency Department:  [facility]: [phone]  [facility]: [phone]            Discharge Instructions: Following a Dilation and Evacuation    What to expect:    Expect small to moderate amount of vaginal bleeding which should taper off in 4-5 days. It should not be heavier than your regular menstrual flow.    Do not douche, and use a pad rather than tampons.     No intercourse until bleeding has ceased.    Activity:    Rest the day of surgery. You may resume normal activity the next day.    You may bathe or shower.    Avoid heavy lifting (10-15 lbs) for one week.    Comfort:    The amount of discomfort you can expect is very unpredictable. If you have pain that cannot be controlled with non-aspirin pain relievers or with the prescription you may have received, you should notify your doctor.    Abdominal cramping (like menstrual cramps) or low back ache are common and should not be a cause for concern. You will be drowsy and weak the day of surgery and possibly the following day.    Diet:    You have no restrictions on your diet. Following surgery, drink plenty of fluids and eat a light meal.    Nausea:    The anesthesia medications you received during your surgical procedure may produce some nausea.    If you feel nauseated, stay in bed, keep your head down and try drinking fluids such as Seven-Up, tea or soup.    Notify Physician at once if you experience:    A fever over 100.4 degrees (a low grade fever under 100 degrees is usual after surgery).    Heavy flow and/or passing large clots. Saturating more than 1 pad per hour for 2 or more hours.     Severe pain or cramps.    Important numbers  [facility] ([address]) - [place]: [phone]   [facility] ([address]) : [phone]  Rev. 5/12

## 2020-07-02 NOTE — OP NOTE
[facility]  OPERATIVE NOTE: DILATION AND EVACUATION   SURGEON: Kalyani Pelletier MD MPH  ASSISTANT(S): none available    PRE-OPERATIVE DIAGNOSIS:   1. Single IUP at 22w4d  2. Concern for labor  3. Hypertension    POST-OPERATIVE DIAGNOSIS:   Same, s/p procedure    PROCEDURE: EUA, Dilation and Evacuation under ultrasound guidance  ANESTHESIA: MAC, cervical block  EBL: 300 mL   IVF: 800 mL LR   UOP: not assessed  COMPLICATIONS: None apparent   SPECIMEN(S):   1. Uterine contents - for gross only exam    INDICATIONS: Marci Siegel is a 40 year old  who presented at 22w2d to [facility] on 20 by 22 week ultrasound desiring termination of pregnancy.  She received mifepristone, digoxin and 8 dilapan-S were placed.  She returned the following day without a ride to receive sedation and presented to [facility] ED yesterday but had to leave to attend other matters.  She represented early this morning after concern for contractions that began ~2 AM. The patient was counseled on risks, benefits and alternatives to the above listed procedure. She received information in compliance with the [place] Woman's Right to Know Act at least 24 hours prior to the procedure. Informed consent was signed prior to the procedure.    FINDINGS:   Normal appearing external genitalia and vaginal mucosa, significant posterior laxity  Cervix 580-3 after osmotic dilator removal, uterine size consistent with gestational age  Fetal tissue inspection at end was complete for GA  Thin EMS and fundus firm at end of D&E    PROCEDURE:   The patient was taken to the operating room and deep sedation was administered.  She received IV doxycycline for surgical prophylaxis.  She was then placed in the dorsal lithotomy position and a safety timeout performed. Eight Dilapan-S which were placed two days prior were removed.  The patient also received adjunctive mifepristone (day of dilators).  The patient was then prepped and draped in  the usual sterile fashion.    A Klopfer speculum was placed into the vagina and the anterior lip of the cervix grasped with a ring forceps.  Cervical anesthesia was injected using a total of 20 cc of 1% polocaine with 5.3 units of vasopressin.  Ultrasound guidance was used during all subsequent intrauterine instrumentation.  AROM was accomplished with a 12 mm curved rigid cannula.  A Bierer forceps was used to evacuate placental and fetal tissue.  A 12-curved suction cannula was inserted and suction aspiration was performed until a gritty texture noted throughout the cavity.  She received IV pitocin and misoprostol 800 mcg rectally to assist with hemostasis.    The ring forceps was removed from the cervix. Good hemostasis was noted after bimanual massage. The instruments were removed from the vagina.  Sponge and needle counts were correct at the close of the case x 2.  After anesthesia reversal, the patient was transferred to the recovery room in stable condition.      Fetal remains will be taken care of by [facility] at the patient's request.    I performed the entire Dilation and Evacuation procedure.  Kalyani Pelletier MD MPH

## 2020-07-02 NOTE — ANESTHESIA PREPROCEDURE EVALUATION
"Anesthesia Pre-Procedure Evaluation    Patient: Marci Siegel   MRN:     [id number] Gender:   female   Age:    40 year old :      1980        Preoperative Diagnosis: * No pre-op diagnosis entered *   Procedure(s):  DILATION AND EVACUATION, UTERUS     LABS:  CBC:   Lab Results   Component Value Date    WBC 13.3 (H) 2020    HGB 10.8 (L) 2020    HCT 32.9 (L) 2020     2020     BMP:   Lab Results   Component Value Date     2020    POTASSIUM 3.7 2020    CHLORIDE 106 2020    CO2 30 2020    BUN 4 (L) 2020    CR 0.56 2020     (H) 2020     COAGS: No results found for: PTT, INR, FIBR  POC: No results found for: BGM, HCG, HCGS  OTHER:   Lab Results   Component Value Date    RAFITA 8.7 2020        Preop Vitals    BP Readings from Last 3 Encounters:   20 (!) 141/96   20 (!) 143/80   13 (!) 171/124    Pulse Readings from Last 3 Encounters:   20 83   20 75   13 103      Resp Readings from Last 3 Encounters:   20 20   20 14   13 20    SpO2 Readings from Last 3 Encounters:   20 99%   20 99%      Temp Readings from Last 1 Encounters:   20 36.9  C (98.5  F) (Oral)    Ht Readings from Last 1 Encounters:   20 1.626 m (5' 4\")      Wt Readings from Last 1 Encounters:   20 97.1 kg (214 lb)    Estimated body mass index is 36.73 kg/m  as calculated from the following:    Height as of 20: 1.626 m (5' 4\").    Weight as of this encounter: 97.1 kg (214 lb).     LDA:        No past medical history on file.   No past surgical history on file.   Allergies   Allergen Reactions     Nkda [No Known Drug Allergies]         Anesthesia Evaluation     . Pt has not had prior anesthetic            ROS/MED HX    ENT/Pulmonary: Comment: Referral for a sleep study, not completed yet    (+)MARYLIN risk factors snores loudly, hypertension, obese, tobacco use, Current use , . .  "   Neurologic:  - neg neurologic ROS     Cardiovascular:     (+) hypertension----. : . . . :. . No previous cardiac testing       METS/Exercise Tolerance:  >4 METS   Hematologic:  - neg hematologic  ROS       Musculoskeletal:  - neg musculoskeletal ROS       GI/Hepatic:  - neg GI/hepatic ROS       Renal/Genitourinary:  - ROS Renal section negative       Endo:  - neg endo ROS       Psychiatric:  - neg psychiatric ROS       Infectious Disease:  - neg infectious disease ROS       Malignancy:      - no malignancy   Other:    (+) Possibly pregnant                        PHYSICAL EXAM:   Mental Status/Neuro: A/A/O   Airway: Facies: Feasible  Mallampati: I  Mouth/Opening: Full  TM distance: > 6 cm  Neck ROM: Full   Respiratory: Auscultation: CTAB     Resp. Rate: Normal     Resp. Effort: Normal      CV: Rhythm: Regular  Rate: Age appropriate  Heart: Normal Sounds  Edema: None   Comments: Lab Results      Component                Value               Date                      WBC                      13.3                07/01/2020            Lab Results      Component                Value               Date                      RBC                      3.89                07/01/2020            Lab Results      Component                Value               Date                      HGB                      10.8                07/01/2020            Lab Results      Component                Value               Date                      HCT                      32.9                07/01/2020            No components found for: MCT  Lab Results      Component                Value               Date                      MCV                      85                  07/01/2020            Lab Results      Component                Value               Date                      MCH                      27.8                07/01/2020            Lab Results      Component                Value               Date                      MCHC                      32.8                07/01/2020            Lab Results      Component                Value               Date                      RDW                      14.6                07/01/2020            Lab Results      Component                Value               Date                      PLT                      270                 07/01/2020                 Dental: Normal Dentition                Assessment:   ASA SCORE: 2 emergent   H&P: History and physical reviewed and following examination; no interval change.   Smoking Status:  Active Smoker        Plan:   Anes. Type:  MAC   Pre-Medication: Acetaminophen; Gabapentin   Induction:  N/a   Airway: Native Airway   Access/Monitoring: PIV   Maintenance: N/a     Postop Plan:   Postop Pain: None  Postop Sedation/Airway: Not planned  Disposition: Outpatient     PONV Management: Adult Risk Factors: Female   Prevention: Ondansetron     CONSENT: Direct conversation   Plan and risks discussed with: Patient   Blood Products: Consented (ALL Blood Products)                   Ventura Aragon DO

## 2020-07-07 ENCOUNTER — TELEPHONE (OUTPATIENT)
Dept: OBGYN | Facility: CLINIC | Age: 40
End: 2020-07-07

## 2020-07-07 NOTE — TELEPHONE ENCOUNTER
Spoke with Jason in [facility] ([phone] option 4) that they need signed parental consent form for product of conception testing to be completed.    Advised nurse will forward to Dr. Pelletier.

## 2020-07-17 LAB — COPATH REPORT: NORMAL

## 2021-05-25 ENCOUNTER — RECORDS - HEALTHEAST (OUTPATIENT)
Dept: ADMINISTRATIVE | Facility: CLINIC | Age: 41
End: 2021-05-25

## 2021-06-08 ENCOUNTER — OFFICE VISIT - HEALTHEAST (OUTPATIENT)
Dept: FAMILY MEDICINE | Facility: CLINIC | Age: 41
End: 2021-06-08

## 2021-06-08 DIAGNOSIS — H10.9 BACTERIAL CONJUNCTIVITIS: ICD-10-CM

## 2021-06-16 PROBLEM — I10 BENIGN ESSENTIAL HYPERTENSION: Status: ACTIVE | Noted: 2018-09-14

## 2021-06-16 PROBLEM — J18.9 CAP (COMMUNITY ACQUIRED PNEUMONIA): Status: ACTIVE | Noted: 2018-09-13

## 2021-06-26 NOTE — PROGRESS NOTES
Assessment & Plan     Marci was seen today for conjunctivitis.    Diagnoses and all orders for this visit:    Bacterial conjunctivitis  -     ofloxacin (OCUFLOX) 0.3 % ophthalmic solution; Administer 1 drop to both eyes 4 (four) times a day for 7 days.      Patient with right greater than left conjunctival injection and discharge.  Granddaughter also with similar symptoms.  Been going on over the past 4 days, differential includes allergic conjunctivitis, viral conjunctivitis, bacterial conjunctivitis.  Minimal pain.  Patient does wear contacts for aesthetic reasons.  Recommended removing contacts, ofloxacin eyedrops and monitoring of symptoms.  If worsening pain or severe symptoms would recommend urgent referral by ophthalmology.  Okay to trial ofloxacin eyedrops for now.  Return precautions discussed.  Patient agreeable with this plan.  [[id number]}     Regino Espinal MD  [facility]    4 days of symtpoms. 2 days. Grandmother is  provider     Subjective   Marci is a 41 year old female and presents today for the following health issues   HPI   Chief Complaint   Patient presents with     Conjunctivitis     bilateral redness in discharge in eyes        Eye irritation and discharge for the last 4 days.  Granddaughter also with similar symptoms.  Never had allergies.  Thinks that she has an eye infection.  Otherwise no fevers chills or sweats.  No ear pain or throat pain.  Doing well otherwise.    Review of Systems  Negative except as noted in the HPI.      Objective      Vitals:    06/08/21 1901   BP: (!) 163/114   Pulse: 67   Resp: 18   Temp: 98.5  F (36.9  C)   SpO2: 97%       Physical Exam  Well-appearing, no distress.  Eyes show conjunctivae injection with clear drainage bilaterally, right greater than left.  Tympanic membranes are normal bilaterally.  Patient declined heart and lung exam.

## 2021-07-06 VITALS
WEIGHT: 201.2 LBS | HEART RATE: 67 BPM | OXYGEN SATURATION: 97 % | RESPIRATION RATE: 18 BRPM | BODY MASS INDEX: 34.54 KG/M2 | DIASTOLIC BLOOD PRESSURE: 114 MMHG | TEMPERATURE: 98.5 F | SYSTOLIC BLOOD PRESSURE: 163 MMHG

## 2021-07-19 ENCOUNTER — OFFICE VISIT (OUTPATIENT)
Dept: FAMILY MEDICINE | Facility: CLINIC | Age: 41
End: 2021-07-19
Payer: COMMERCIAL

## 2021-07-19 ENCOUNTER — NURSE TRIAGE (OUTPATIENT)
Dept: NURSING | Facility: CLINIC | Age: 41
End: 2021-07-19

## 2021-07-19 ENCOUNTER — HOSPITAL ENCOUNTER (EMERGENCY)
Facility: HOSPITAL | Age: 41
Discharge: LEFT BEFORE DISCHARGE | End: 2021-07-19
Payer: COMMERCIAL

## 2021-07-19 ENCOUNTER — HOSPITAL ENCOUNTER (EMERGENCY)
Facility: HOSPITAL | Age: 41
Discharge: HOME | End: 2021-07-19
Payer: COMMERCIAL

## 2021-07-19 VITALS
OXYGEN SATURATION: 98 % | WEIGHT: 170 LBS | SYSTOLIC BLOOD PRESSURE: 186 MMHG | RESPIRATION RATE: 18 BRPM | TEMPERATURE: 97.4 F | HEART RATE: 122 BPM | HEIGHT: 64 IN | DIASTOLIC BLOOD PRESSURE: 116 MMHG | BODY MASS INDEX: 29.02 KG/M2

## 2021-07-19 VITALS
RESPIRATION RATE: 12 BRPM | DIASTOLIC BLOOD PRESSURE: 152 MMHG | SYSTOLIC BLOOD PRESSURE: 212 MMHG | HEART RATE: 88 BPM | OXYGEN SATURATION: 100 % | TEMPERATURE: 99 F

## 2021-07-19 DIAGNOSIS — M62.838 MUSCLE SPASM: Primary | ICD-10-CM

## 2021-07-19 PROCEDURE — 99215 OFFICE O/P EST HI 40 MIN: CPT | Performed by: FAMILY MEDICINE

## 2021-07-19 RX ORDER — HYDROXYZINE HYDROCHLORIDE 50 MG/1
50 TABLET, FILM COATED ORAL
COMMUNITY
Start: 2020-01-13 | End: 2021-08-02

## 2021-07-19 RX ORDER — CYCLOBENZAPRINE HCL 10 MG
10 TABLET ORAL 3 TIMES DAILY PRN
Qty: 20 TABLET | Refills: 0 | Status: SHIPPED | OUTPATIENT
Start: 2021-07-19

## 2021-07-19 RX ORDER — AMLODIPINE BESYLATE 5 MG/1
5 TABLET ORAL
COMMUNITY
Start: 2020-01-13

## 2021-07-19 ASSESSMENT — MIFFLIN-ST. JEOR: SCORE: 1426.11

## 2021-07-19 NOTE — PATIENT INSTRUCTIONS
Marci has been diagnosed with muscle spasms.  Please excuse her from work today.  She may resume work when cleared by her primary physician, Dr. Hernandez.

## 2021-07-19 NOTE — TELEPHONE ENCOUNTER
Calling to clarify how to take her medications.  Was just at the [place] walk in clinic.  In the providers notes it is charted she taking amlodipine.  When she went to [facility] to get her RX for blood pressure she was given Prazosin 2 mg.  Prazosin was prescribed by KRYSTINA Rubin from the [facility] [phone].      Writer called and spoke to the [facility] triage nurse.  Amlodipine is an old RX the patient is no longer on.  Clinic requested the patient make an appointment to come in and have B/P rechecked.     Write review with patient and her write down her medication and they were for and when to take them.  Gave her [facility] phone number to class and make an appointment foe B/P check.    Flower Mandujano RN MAN   Triage Nurse Advisor [facility]      Reason for Disposition    Caller has urgent medication question about med that PCP prescribed and triager unable to answer question    Protocols used: MEDICATION QUESTION CALL-A-[place]

## 2021-07-19 NOTE — ED TRIAGE NOTES
Pt arrives via EMS to triage. Pt began having abdominal spasms and she went to clinic. Pt was htn there and they wanted her to go to ER. Pt refused, went home. Pt called EMS. Pt has been off bp meds x 1 week per choice. Pt took a dose today-prazosin.

## 2021-07-19 NOTE — PROGRESS NOTES
"S: Very pleasant 41-year-old female presents today with anxiety and \"muscle cramps\".  She notes that her muscle spasm and she feels extremely anxious.  This is been going on for the past 24 hours.  She is recently been homeless, but now is in a home and has multiple responsibilities.  She also is missing work today which is causing her a great deal of stress.  ROS: Negative for fever.  Negative for trauma.  Negative for rashes.    Meds:  Norvasc and Atarax    O: Blood pressure 209/152, temperature 99 pulse 90 respiration 12  Alert conversant and appears anxious  Heart regular rhythm  Lungs clear to auscultation    A: Hypertensive emergency    P: We rechecked the blood pressure and it was again 209/152.  Menstrual cycle status in the chart shows pregnancy however multiple confirmations with the patient reveals that she is not pregnant.  Due to the severe hypertension and the concern that she is having \"jittery feelings\" as well as spasms I feel it is reasonable to have her go directly to emergency department for evaluation she agreed to do so.  She will be going across the street to the [facility] emergency department.  "

## 2021-07-30 ENCOUNTER — OFFICE VISIT (OUTPATIENT)
Dept: FAMILY MEDICINE | Facility: CLINIC | Age: 41
End: 2021-07-30
Payer: COMMERCIAL

## 2021-07-30 VITALS
OXYGEN SATURATION: 97 % | DIASTOLIC BLOOD PRESSURE: 131 MMHG | BODY MASS INDEX: 29.32 KG/M2 | RESPIRATION RATE: 10 BRPM | TEMPERATURE: 98.6 F | SYSTOLIC BLOOD PRESSURE: 190 MMHG | WEIGHT: 170.8 LBS | HEART RATE: 79 BPM

## 2021-07-30 DIAGNOSIS — H93.8X1 EAR CONGESTION, RIGHT: ICD-10-CM

## 2021-07-30 DIAGNOSIS — J35.1 ENLARGED TONSILS: ICD-10-CM

## 2021-07-30 DIAGNOSIS — R07.0 THROAT PAIN: Primary | ICD-10-CM

## 2021-07-30 LAB
DEPRECATED S PYO AG THROAT QL EIA: NEGATIVE
GROUP A STREP BY PCR: NOT DETECTED

## 2021-07-30 PROCEDURE — 87651 STREP A DNA AMP PROBE: CPT | Performed by: STUDENT IN AN ORGANIZED HEALTH CARE EDUCATION/TRAINING PROGRAM

## 2021-07-30 PROCEDURE — 99214 OFFICE O/P EST MOD 30 MIN: CPT | Performed by: STUDENT IN AN ORGANIZED HEALTH CARE EDUCATION/TRAINING PROGRAM

## 2021-07-30 RX ORDER — PRAZOSIN HYDROCHLORIDE 2 MG/1
CAPSULE ORAL
COMMUNITY
Start: 2021-07-19

## 2021-07-30 RX ORDER — HYDROXYZINE PAMOATE 50 MG/1
CAPSULE ORAL
COMMUNITY
Start: 2021-02-18

## 2021-07-30 RX ORDER — PSEUDOEPHEDRINE HCL 30 MG
30 TABLET ORAL EVERY 4 HOURS PRN
Qty: 20 TABLET | Refills: 0 | Status: SHIPPED | OUTPATIENT
Start: 2021-07-30

## 2021-07-30 NOTE — PATIENT INSTRUCTIONS
Follow up with a primary doctor and consider referral to ENT (ear, nose, throat) doctor to evaluate your large tonsils.       Patient Education     Self-Care for Sore Throats     Sore throats happen for many reasons, such as colds, allergies, cigarette smoke, air pollution, and infections caused by viruses or bacteria. In any case, your throat becomes red and sore. Your goal for self-care is to reduce your discomfort while giving your throat a chance to heal.  Moisten and soothe your throat  Tips include the following:    Try a sip of water first thing after waking up.    Keep your throat moist by drinking 6 or more glasses of clear liquids every day.    Run a cool-air humidifier in your room overnight.    Stay away from cigarette smoke.     Check the air quality index,if air pollution gives you a sore throat. On high pollution days, try to limit outdoor time.    Suck on throat lozenges, cough drops, hard candy, ice chips, or frozen fruit-juice bars. Use the sugar-free versions if your diet or medical condition requires them.  Gargle to ease irritation  Gargling every hour or 2 can ease irritation. Try gargling with 1 of these solutions:    1/4 teaspoon of salt in 1/2 cup of warm water    An over-the-counter anesthetic gargle  Use medicine for more relief  Over-the-counter medicine can reduce sore throat symptoms. Ask your pharmacist if you have questions about which medicine to use. To prevent possible medicine interactions, let the pharmacist know what medicines you take. To decrease symptoms:    Ease pain with anesthetic sprays. Aspirin or an aspirin substitute also helps. Remember, never give aspirin to anyone 18 or younger. Don't take aspirin if you are already taking blood thinners.     For sore throats caused by allergies, try antihistamines to block the allergic reaction.  Unless a sore throat is caused by a bacterial infection, antibiotics won t help you.  Prevent future sore throats  Prevention tips  include:    Stop smoking or reduce contact with secondhand smoke. Smoke irritates the tender throat lining.    Limit contact with pets and with allergy-causing substances, such as pollen and mold.    Wash your hands often when you re around someone with a sore throat or cold. This will keep viruses or bacteria from spreading.    Limit outdoor time when air pollution is bad.    Don t strain your vocal cords.  When to call your healthcare provider  Contact your healthcare provider if you have:    Fever of 100.4 F (38.0 C) or higher, or as directed by your healthcare provider    White spots on the throat    Great Trouble swallowing    A skin rash    Recent exposure to someone else with strep bacteria    Severe hoarseness and swollen glands in the neck or jaw  Call 911  Call 911 if any of the following occur:    Trouble breathing or catching your breath    Drooling and problems swallowing    Wheezing    Unable to talk    Feeling dizzy or faint    Feeling of doom  Piter last reviewed this educational content on 9/1/2019 2000-2021 [employer]. All rights reserved. This information is not intended as a substitute for professional medical care. Always follow your healthcare professional's instructions.

## 2021-07-30 NOTE — PROGRESS NOTES
There are no exam notes on file for this visit.  Chief Complaint   Patient presents with     Pharyngitis     ear pain, throat pain since this morning      Blood pressure (!) 190/131, pulse 79, temperature 98.6  F (37  C), temperature source Oral, resp. rate 10, weight 77.5 kg (170 lb 12.8 oz), last menstrual period 07/01/2021, SpO2 97 %, unknown if currently breastfeeding.           SUBJECTIVE       Marci is a 41 year old  female with a PMH significant for:     Patient Active Problem List   Diagnosis     CAP (community acquired pneumonia)     Benign essential hypertension     She presents with complaints of throat pain since this morning.  Associated symptoms include right ear congestion.  Otherwise, denies any fevers, chills, cough, congestion, chest pain, shortness of breath.  Patient wondering if she has strep.  Does report family history of enlarged tonsils.        OBJECTIVE     Vitals:    07/30/21 1441   BP: (!) 190/131   BP Location: Left arm   Patient Position: Sitting   Cuff Size: Adult Regular   Pulse: 79   Resp: 10   Temp: 98.6  F (37  C)   TempSrc: Oral   SpO2: 97%   Weight: 77.5 kg (170 lb 12.8 oz)     Body mass index is 29.32 kg/m .    Constitutional: Awake, alert, cooperative, no acute distress, and appears stated age.  Eyes: sclera clear, conjunctiva normal.  ENT: NC/AT. TM clear bilaterally. Tonsils enlarged and mildly erythematous and without exudates or trismus.  Neck: Supple, symmetrical, trachea midline.  Mild submandibular LAD.  Lungs: No increased WOB.   Neurologic: A&Ox3.  Cranial nerves II-XII are grossly intact.    Neuropsychiatric: Normal affect, mood, orientation, memory and insight.    Results for orders placed or performed in visit on 07/30/21   Streptococcus A Rapid Screen w/Reflex to PCR - Clinic Collect     Status: Normal    Specimen: Throat; Swab   Result Value Ref Range    Group A Strep antigen Negative Negative       ASSESSMENT AND PLAN     Marci was seen today for  pharyngitis.    Diagnoses and all orders for this visit:    Throat pain  Likely viral in etiology.  Recommend symptomatic management with rest, hydration, Tylenol/ibuprofen in addition to Sudafed below for your congestion.  Follow-up PCR strep, treat if indicated.  Return precautions advised.  -     Streptococcus A Rapid Screen w/Reflex to PCR - Clinic Collect  -     Group A Streptococcus PCR Throat Swab    Ear congestion, right  -     pseudoePHEDrine (SUDAFED) 30 MG tablet; Take 1 tablet (30 mg) by mouth every 4 hours as needed for congestion    Enlarged tonsils  Recommend following up with PCP for possible ENT referral to evaluate for tonsillectomy.      Return if symptoms worsen or fail to improve.    Praveen Beckman MD  7/30/2021

## 2022-04-05 ENCOUNTER — TRANSFERRED RECORDS (OUTPATIENT)
Dept: HEALTH INFORMATION MANAGEMENT | Facility: CLINIC | Age: 42
End: 2022-04-05
Payer: COMMERCIAL

## 2022-04-05 ENCOUNTER — MEDICAL CORRESPONDENCE (OUTPATIENT)
Dept: HEALTH INFORMATION MANAGEMENT | Facility: CLINIC | Age: 42
End: 2022-04-05
Payer: COMMERCIAL

## 2022-04-27 ENCOUNTER — TRANSCRIBE ORDERS (OUTPATIENT)
Dept: OTHER | Age: 42
End: 2022-04-27

## 2022-04-27 DIAGNOSIS — Z91.81 RISK FOR FALLS: ICD-10-CM

## 2022-04-27 DIAGNOSIS — R26.81 UNSTEADY GAIT: Primary | ICD-10-CM

## (undated) DEVICE — LINEN TOWEL PACK X5 5464

## (undated) DEVICE — Device

## (undated) DEVICE — SOL WATER IRRIG 1000ML BOTTLE 2F7114

## (undated) DEVICE — GLOVE PROTEXIS W/NEU-THERA 6.5  2D73TE65

## (undated) DEVICE — GLOVE PROTEXIS BLUE W/NEU-THERA 7.0  2D73EB70

## (undated) DEVICE — LINEN GOWN X4 5410

## (undated) DEVICE — SOL NACL 0.9% IRRIG 1000ML BOTTLE 2F7124

## (undated) DEVICE — PEN MARKING SKIN W/LABELS 31145918

## (undated) DEVICE — SPONGE RAY-TEC 4X8" 7318

## (undated) DEVICE — TUBING SUCTION VACUUM COLLECTION 6FT 610

## (undated) DEVICE — STRAP KNEE/BODY 31143004

## (undated) DEVICE — PAD CHUX UNDERPAD 30X36" P3036C

## (undated) DEVICE — SUCTION CANNULA UTERINE 12MM CVD  21555

## (undated) DEVICE — DRAPE UNDER BUTTOCK 8483

## (undated) DEVICE — SUCTION VACUUM CANISTER STANDARD W/LID&CAPS 003987-901

## (undated) DEVICE — SYR 01ML 27GA 0.5" NDL TBC 309623

## (undated) DEVICE — DECANTER TRANSFER DEVICE 2008S

## (undated) RX ORDER — LIDOCAINE HYDROCHLORIDE 10 MG/ML
INJECTION, SOLUTION EPIDURAL; INFILTRATION; INTRACAUDAL; PERINEURAL
Status: DISPENSED
Start: 2020-07-02

## (undated) RX ORDER — FENTANYL CITRATE 50 UG/ML
INJECTION, SOLUTION INTRAMUSCULAR; INTRAVENOUS
Status: DISPENSED
Start: 2020-07-02

## (undated) RX ORDER — MISOPROSTOL 200 UG/1
TABLET ORAL
Status: DISPENSED
Start: 2020-07-02

## (undated) RX ORDER — ACETAMINOPHEN 325 MG/1
TABLET ORAL
Status: DISPENSED
Start: 2020-07-02

## (undated) RX ORDER — IBUPROFEN 600 MG/1
TABLET, FILM COATED ORAL
Status: DISPENSED
Start: 2020-07-02

## (undated) RX ORDER — VASOPRESSIN 20 U/ML
INJECTION PARENTERAL
Status: DISPENSED
Start: 2020-07-02

## (undated) RX ORDER — LIDOCAINE HYDROCHLORIDE 20 MG/ML
INJECTION, SOLUTION EPIDURAL; INFILTRATION; INTRACAUDAL; PERINEURAL
Status: DISPENSED
Start: 2020-07-02

## (undated) RX ORDER — GABAPENTIN 300 MG/1
CAPSULE ORAL
Status: DISPENSED
Start: 2020-07-02

## (undated) RX ORDER — OXYCODONE HYDROCHLORIDE 5 MG/1
TABLET ORAL
Status: DISPENSED
Start: 2020-07-02

## (undated) RX ORDER — METHYLERGONOVINE MALEATE 0.2 MG/ML
INJECTION INTRAVENOUS
Status: DISPENSED
Start: 2020-07-02

## (undated) RX ORDER — SODIUM CHLORIDE, SODIUM LACTATE, POTASSIUM CHLORIDE, CALCIUM CHLORIDE 600; 310; 30; 20 MG/100ML; MG/100ML; MG/100ML; MG/100ML
INJECTION, SOLUTION INTRAVENOUS
Status: DISPENSED
Start: 2020-07-02

## (undated) RX ORDER — PROPOFOL 10 MG/ML
INJECTION, EMULSION INTRAVENOUS
Status: DISPENSED
Start: 2020-07-02